# Patient Record
Sex: FEMALE | Race: WHITE | NOT HISPANIC OR LATINO | URBAN - METROPOLITAN AREA
[De-identification: names, ages, dates, MRNs, and addresses within clinical notes are randomized per-mention and may not be internally consistent; named-entity substitution may affect disease eponyms.]

---

## 2017-01-11 ENCOUNTER — COMMUNICATION - HEALTHEAST (OUTPATIENT)
Dept: SCHEDULING | Facility: CLINIC | Age: 27
End: 2017-01-11

## 2017-12-22 ENCOUNTER — COMMUNICATION - HEALTHEAST (OUTPATIENT)
Dept: SCHEDULING | Facility: CLINIC | Age: 27
End: 2017-12-22

## 2017-12-25 ENCOUNTER — COMMUNICATION - HEALTHEAST (OUTPATIENT)
Dept: SCHEDULING | Facility: CLINIC | Age: 27
End: 2017-12-25

## 2019-02-27 ENCOUNTER — HOSPITAL ENCOUNTER (OUTPATIENT)
Dept: MEDSURG UNIT | Facility: CLINIC | Age: 29
Discharge: HOME | End: 2019-02-28
Attending: FAMILY MEDICINE | Admitting: FAMILY MEDICINE

## 2019-02-27 DIAGNOSIS — B96.89 BACTERIAL VAGINOSIS: ICD-10-CM

## 2019-02-27 DIAGNOSIS — N76.0 BACTERIAL VAGINOSIS: ICD-10-CM

## 2019-02-27 ASSESSMENT — MIFFLIN-ST. JEOR: SCORE: 1263.13

## 2019-02-28 LAB
CLUE CELLS: ABNORMAL
TRICHOMONAS, WET PREP: ABNORMAL
YEAST, WET PREP: ABNORMAL

## 2019-03-01 LAB
C TRACH DNA SPEC QL PROBE+SIG AMP: NEGATIVE
N GONORRHOEA DNA SPEC QL NAA+PROBE: NEGATIVE

## 2019-03-02 ENCOUNTER — HOSPITAL ENCOUNTER (OUTPATIENT)
Dept: OBGYN | Facility: HOSPITAL | Age: 29
Discharge: HOME | End: 2019-03-02
Attending: OBSTETRICS & GYNECOLOGY | Admitting: OBSTETRICS & GYNECOLOGY

## 2019-03-02 DIAGNOSIS — M54.9 BACK PAIN AFFECTING PREGNANCY, ANTEPARTUM: ICD-10-CM

## 2019-03-02 DIAGNOSIS — O99.891 BACK PAIN AFFECTING PREGNANCY, ANTEPARTUM: ICD-10-CM

## 2019-03-02 LAB
ABO/RH(D): NORMAL
ABO/RH(D): NORMAL
ALBUMIN UR-MCNC: ABNORMAL MG/DL
AMPHETAMINES UR QL SCN: ABNORMAL
ANTIBODY SCREEN: NORMAL
APPEARANCE UR: ABNORMAL
BACTERIA #/AREA URNS HPF: ABNORMAL HPF
BARBITURATES UR QL: ABNORMAL
BENZODIAZ UR QL: ABNORMAL
BILIRUB UR QL STRIP: NEGATIVE
CANNABINOIDS UR QL SCN: ABNORMAL
COCAINE UR QL: ABNORMAL
COLOR UR AUTO: YELLOW
COMPONENT (HISTORICAL CONVERSION): NORMAL
CREAT UR-MCNC: 70 MG/DL
ERYTHROCYTE [DISTWIDTH] IN BLOOD BY AUTOMATED COUNT: 12.4 % (ref 11–14.5)
ETHANOL UR CFM-MCNC: <10 MG/DL
FBS KIT EXPIRATION DATE: NORMAL
FBS KIT LOT #: NORMAL
FETAL BLEED SCREEN: NORMAL
GLUCOSE UR STRIP-MCNC: NEGATIVE MG/DL
HBV SURFACE AG SERPL QL IA: NEGATIVE
HCT VFR BLD AUTO: 28.5 % (ref 35–47)
HGB BLD-MCNC: 9.8 G/DL (ref 12–16)
HGB UR QL STRIP: ABNORMAL
HIV 1+2 AB+HIV1 P24 AG SERPL QL IA: NEGATIVE
KETONES UR STRIP-MCNC: NEGATIVE MG/DL
LEUKOCYTE ESTERASE UR QL STRIP: ABNORMAL
LOT NUMBER (HISTORICAL CONVERSION): NORMAL
MCH RBC QN AUTO: 30.5 PG (ref 27–34)
MCHC RBC AUTO-ENTMCNC: 34.4 G/DL (ref 32–36)
MCV RBC AUTO: 89 FL (ref 80–100)
METHADONE UR QL SCN: ABNORMAL
MUCOUS THREADS #/AREA URNS LPF: ABNORMAL LPF
NITRATE UR QL: NEGATIVE
OPIATES UR QL SCN: ABNORMAL
OXYCODONE UR QL: ABNORMAL
PCP UR QL SCN: ABNORMAL
PH UR STRIP: 7.5 [PH] (ref 4.5–8)
PLATELET # BLD AUTO: 210 THOU/UL (ref 140–440)
PMV BLD AUTO: 11.1 FL (ref 8.5–12.5)
RBC # BLD AUTO: 3.21 MILL/UL (ref 3.8–5.4)
RBC #/AREA URNS AUTO: ABNORMAL HPF
SP GR UR STRIP: 1.02 (ref 1–1.03)
SQUAMOUS #/AREA URNS AUTO: >100 LPF
STATUS (HISTORICAL CONVERSION): NORMAL
UROBILINOGEN UR STRIP-ACNC: ABNORMAL
WBC #/AREA URNS AUTO: ABNORMAL HPF
WBC: 10.2 THOU/UL (ref 4–11)

## 2019-03-02 ASSESSMENT — MIFFLIN-ST. JEOR: SCORE: 1263.13

## 2019-03-03 LAB
BACTERIA SPEC CULT: NO GROWTH
T PALLIDUM AB SER QL: NEGATIVE

## 2019-03-04 LAB — RUBV IGG SERPL QL IA: POSITIVE

## 2019-03-05 LAB
AMPHET UR-MCNC: 1845 NG/ML
CARBOXYTHC UR-MCNC: 53 NG/ML
MDA UR-MCNC: <200 NG/ML
MDEA UR-MCNC: <200 NG/ML
MDMA UR-MCNC: <200 NG/ML
METHAMPHET UR-MCNC: 7967 NG/ML
PHENTERMINE UR CFM-MCNC: <200 NG/ML

## 2019-03-21 ENCOUNTER — HOSPITAL ENCOUNTER (OUTPATIENT)
Dept: MEDSURG UNIT | Facility: CLINIC | Age: 29
Discharge: LEFT BEFORE DISCHARGE | End: 2019-03-21
Attending: FAMILY MEDICINE | Admitting: FAMILY MEDICINE

## 2019-03-21 LAB
ALBUMIN UR-MCNC: NEGATIVE MG/DL
AMPHETAMINES UR QL SCN: ABNORMAL
APPEARANCE UR: ABNORMAL
BACTERIA #/AREA URNS HPF: ABNORMAL HPF
BARBITURATES UR QL: ABNORMAL
BENZODIAZ UR QL: ABNORMAL
BILIRUB UR QL STRIP: NEGATIVE
CANNABINOIDS UR QL SCN: ABNORMAL
CLUE CELLS: NORMAL
COCAINE UR QL: ABNORMAL
COLOR UR AUTO: YELLOW
CREAT UR-MCNC: 92.7 MG/DL
ETHANOL UR CFM-MCNC: <10 MG/DL
GLUCOSE UR STRIP-MCNC: NEGATIVE MG/DL
HGB UR QL STRIP: ABNORMAL
KETONES UR STRIP-MCNC: NEGATIVE MG/DL
LEUKOCYTE ESTERASE UR QL STRIP: NEGATIVE
METHADONE UR QL SCN: ABNORMAL
MUCOUS THREADS #/AREA URNS LPF: ABNORMAL LPF
NITRATE UR QL: NEGATIVE
OPIATES UR QL SCN: ABNORMAL
OXYCODONE UR QL: ABNORMAL
PCP UR QL SCN: ABNORMAL
PH UR STRIP: 6 [PH] (ref 4.5–8)
RBC #/AREA URNS AUTO: ABNORMAL HPF
SP GR UR STRIP: 1.01 (ref 1–1.03)
SQUAMOUS #/AREA URNS AUTO: >100 LPF
TRICHOMONAS, WET PREP: NORMAL
UROBILINOGEN UR STRIP-ACNC: ABNORMAL
WBC #/AREA URNS AUTO: ABNORMAL HPF
YEAST, WET PREP: NORMAL

## 2019-03-21 ASSESSMENT — MIFFLIN-ST. JEOR: SCORE: 1263.13

## 2019-03-22 LAB
C TRACH DNA SPEC QL PROBE+SIG AMP: NEGATIVE
N GONORRHOEA DNA SPEC QL NAA+PROBE: NEGATIVE

## 2019-03-24 LAB
AMPHET UR-MCNC: 461 NG/ML
CARBOXYTHC UR-MCNC: 27 NG/ML
MDA UR-MCNC: <200 NG/ML
MDEA UR-MCNC: <200 NG/ML
MDMA UR-MCNC: <200 NG/ML
METHAMPHET UR-MCNC: 666 NG/ML
PHENTERMINE UR CFM-MCNC: <200 NG/ML

## 2019-04-06 ENCOUNTER — HOSPITAL ENCOUNTER (OUTPATIENT)
Dept: OBGYN | Facility: CLINIC | Age: 29
Discharge: HOME | End: 2019-04-06
Attending: STUDENT IN AN ORGANIZED HEALTH CARE EDUCATION/TRAINING PROGRAM | Admitting: STUDENT IN AN ORGANIZED HEALTH CARE EDUCATION/TRAINING PROGRAM

## 2019-04-06 ENCOUNTER — COMMUNICATION - HEALTHEAST (OUTPATIENT)
Dept: SCHEDULING | Facility: CLINIC | Age: 29
End: 2019-04-06

## 2019-04-06 DIAGNOSIS — J45.909 ASTHMA, UNSPECIFIED ASTHMA SEVERITY, UNSPECIFIED WHETHER COMPLICATED, UNSPECIFIED WHETHER PERSISTENT: ICD-10-CM

## 2019-04-06 DIAGNOSIS — G47.00 INSOMNIA, UNSPECIFIED TYPE: ICD-10-CM

## 2019-04-11 ENCOUNTER — HOSPITAL ENCOUNTER (OUTPATIENT)
Dept: MEDSURG UNIT | Facility: CLINIC | Age: 29
Discharge: HOME | End: 2019-04-11
Attending: FAMILY MEDICINE | Admitting: FAMILY MEDICINE

## 2019-04-11 LAB
ALBUMIN UR-MCNC: ABNORMAL MG/DL
ANION GAP SERPL CALCULATED.3IONS-SCNC: 9 MMOL/L (ref 5–18)
APPEARANCE UR: ABNORMAL
BACTERIA #/AREA URNS HPF: ABNORMAL HPF
BASOPHILS # BLD AUTO: 0 THOU/UL (ref 0–0.2)
BASOPHILS NFR BLD AUTO: 0 % (ref 0–2)
BILIRUB UR QL STRIP: NEGATIVE
BUN SERPL-MCNC: 8 MG/DL (ref 8–22)
CALCIUM SERPL-MCNC: 8.9 MG/DL (ref 8.5–10.5)
CHLORIDE BLD-SCNC: 105 MMOL/L (ref 98–107)
CLUE CELLS: NORMAL
CO2 SERPL-SCNC: 21 MMOL/L (ref 22–31)
COLOR UR AUTO: YELLOW
CREAT SERPL-MCNC: 0.69 MG/DL (ref 0.6–1.1)
EOSINOPHIL # BLD AUTO: 0.1 THOU/UL (ref 0–0.4)
EOSINOPHIL NFR BLD AUTO: 1 % (ref 0–6)
ERYTHROCYTE [DISTWIDTH] IN BLOOD BY AUTOMATED COUNT: 12.7 % (ref 11–14.5)
GFR SERPL CREATININE-BSD FRML MDRD: >60 ML/MIN/1.73M2
GLUCOSE BLD-MCNC: 109 MG/DL (ref 70–125)
GLUCOSE UR STRIP-MCNC: NEGATIVE MG/DL
HCT VFR BLD AUTO: 29.9 % (ref 35–47)
HGB BLD-MCNC: 9.9 G/DL (ref 12–16)
HGB UR QL STRIP: NEGATIVE
KETONES UR STRIP-MCNC: NEGATIVE MG/DL
LEUKOCYTE ESTERASE UR QL STRIP: ABNORMAL
LYMPHOCYTES # BLD AUTO: 1.5 THOU/UL (ref 0.8–4.4)
LYMPHOCYTES NFR BLD AUTO: 15 % (ref 20–40)
MCH RBC QN AUTO: 28.9 PG (ref 27–34)
MCHC RBC AUTO-ENTMCNC: 33.1 G/DL (ref 32–36)
MCV RBC AUTO: 87 FL (ref 80–100)
MONOCYTES # BLD AUTO: 0.7 THOU/UL (ref 0–0.9)
MONOCYTES NFR BLD AUTO: 6 % (ref 2–10)
MUCOUS THREADS #/AREA URNS LPF: ABNORMAL LPF
NEUTROPHILS # BLD AUTO: 8.1 THOU/UL (ref 2–7.7)
NEUTROPHILS NFR BLD AUTO: 78 % (ref 50–70)
NITRATE UR QL: NEGATIVE
PH UR STRIP: 6 [PH] (ref 4.5–8)
PLATELET # BLD AUTO: 263 THOU/UL (ref 140–440)
PMV BLD AUTO: 11.6 FL (ref 8.5–12.5)
POTASSIUM BLD-SCNC: 4 MMOL/L (ref 3.5–5)
RBC # BLD AUTO: 3.42 MILL/UL (ref 3.8–5.4)
RBC #/AREA URNS AUTO: ABNORMAL HPF
SODIUM SERPL-SCNC: 135 MMOL/L (ref 136–145)
SP GR UR STRIP: 1.02 (ref 1–1.03)
SQUAMOUS #/AREA URNS AUTO: >100 LPF
TRICHOMONAS, WET PREP: NORMAL
UROBILINOGEN UR STRIP-ACNC: ABNORMAL
WBC #/AREA URNS AUTO: ABNORMAL HPF
WBC: 10.5 THOU/UL (ref 4–11)
YEAST, WET PREP: NORMAL

## 2019-04-11 ASSESSMENT — MIFFLIN-ST. JEOR: SCORE: 1300.56

## 2019-04-12 LAB — BACTERIA SPEC CULT: NO GROWTH

## 2019-05-02 ENCOUNTER — HOSPITAL ENCOUNTER (OUTPATIENT)
Dept: MEDSURG UNIT | Facility: CLINIC | Age: 29
Discharge: HOME | End: 2019-05-02
Attending: FAMILY MEDICINE | Admitting: FAMILY MEDICINE

## 2019-05-02 DIAGNOSIS — R05.9 COUGH: ICD-10-CM

## 2021-01-25 ENCOUNTER — COMMUNICATION - HEALTHEAST (OUTPATIENT)
Dept: SCHEDULING | Facility: CLINIC | Age: 31
End: 2021-01-25

## 2021-05-27 NOTE — TELEPHONE ENCOUNTER
"RN Assessment/Reason for Call:   Okay to leave Detailed Message  Patient calling in, 34 weeks pregnant.  \" haven't felt baby moved for a couple hours \"  [facility]  Plans to deliver in [facility].    RN Action/Disposition:  Protocol recommends ER.  Given Phone number for  NL.  Discussed kick count.  Agrees to plan.     Vanessa Fernandez, RN    [facility] Connection Triage/med refill  4/6/2019  9:25 PM        Reason for Disposition    [1] Pregnant 23 or more weeks AND [2] baby moving less today AND [3] unable or unwilling to perform kick count    Protocols used: PREGNANCY - DECREASED FETAL MOVEMENT-A-AH      "

## 2021-05-27 NOTE — PROGRESS NOTES
"[facility] OB Triage Note    CC: Vomiting, back and lower abdominal cramps    Subjective: Malcolm Norman is a  29 y.o. at 34w5d with a current prenatal history significant for late prenatal care, BV, iron deficiency anemia (ferritin 8 on 3/22/19) and amphetamines/THC/tobacco use during current pregnancy who presents to OB triage with vomiting since this morning.     Patient reports she has vomited 7x since waking up this morning. No hematemesis or significant nausea. Having some lower abdominal and low back pain that both feel like cramps, not contractions. Feeling ill for the past few days with a \"cold\", mild frontal headache and dry cough. No sick contacts, recent travel, suspicious food intake.     Endorses feeling both warm and with chills, but denies fevers or rigorous chills. Denies vision changes, shortness of breath, chest pain, epigastric/RUQ abd pain, dysuria, urinary frequency, urinary retention, vaginal discharge or irritation, and LE swelling. Has taken Tylenol 2x today without much relief. She continues to smoke 2 cigarettes daily; denies any other illicit drug use.     No contractions. No loss of fluid. No vaginal bleeding. Normal fetal movement.     Estimated Date of Delivery: 19 Patient's last menstrual period was 2018.  OB provider: Provider, No Primary Care  OB History        2    Para   1    Term   1            AB        Living   1       SAB        TAB        Ectopic        Multiple        Live Births   1             Hx of c/s about 12 years ago.     Objective:   Blood pressure 122/78, pulse 97, temperature 98.2  F (36.8  C), temperature source Oral, resp. rate 18, height 5' 3\" (1.6 m), weight 137 lb (62.1 kg), last menstrual period 2018.  General:   alert, appears stated age, cooperative and no distress   Skin:   normal   HEENT:  extra ocular movement intact   Lungs:   clear to auscultation bilaterally   Heart:   regular rate and rhythm, S1, " S2 normal, no murmur, click, rub or gallop   Extremities: Warm, dry and well perfused. No LE edema.   Abdomen: FHT present. Mild lower abdominal and low back tenderness, but no rebound or CVA tenderness   Membranes Intact per history   [place]:  None   FHT: Baseline: 145 bpm, Variability: Moderate (6 - 25 bpm), Accelerations: Present and Decelerations: Absent   Presentation: unsure   Cervix: Not performed     Laboratory Studies:   Results for orders placed or performed during the hospital encounter of 04/11/19   Wet Prep, Vaginal   Result Value Ref Range    Yeast Result No yeast seen No yeast seen    Trichomonas No Trichomonas seen No Trichomonas seen    Clue Cells, Wet Prep No Clue cells seen No Clue cells seen   Urinalysis-UC if Indicated   Result Value Ref Range    Color, UA Yellow Colorless, Yellow, Straw, Light Yellow    Clarity, UA Cloudy (!) Clear    Glucose, UA Negative Negative    Bilirubin, UA Negative Negative    Ketones, UA Negative Negative    Specific Gravity, UA 1.020 1.001 - 1.030    Blood, UA Negative Negative    pH, UA 6.0 4.5 - 8.0    Protein,  mg/dL (!) Negative mg/dL    Urobilinogen, UA 2.0 E.U./dL <2.0 E.U./dL, 2.0 E.U./dL    Nitrite, UA Negative Negative    Leukocytes, UA Large (!) Negative    Bacteria, UA Few (!) None Seen hpf    RBC, UA 3-5 (!) None Seen, 0-2 hpf    WBC, UA 5-10 (!) None Seen, 0-5 hpf    Squam Epithel, UA >100 (!) None Seen, 0-5 lpf    Mucus, UA Moderate (!) None Seen lpf   Basic Metabolic Panel   Result Value Ref Range    Sodium 135 (L) 136 - 145 mmol/L    Potassium 4.0 3.5 - 5.0 mmol/L    Chloride 105 98 - 107 mmol/L    CO2 21 (L) 22 - 31 mmol/L    Anion Gap, Calculation 9 5 - 18 mmol/L    Glucose 109 70 - 125 mg/dL    Calcium 8.9 8.5 - 10.5 mg/dL    BUN 8 8 - 22 mg/dL    Creatinine 0.69 0.60 - 1.10 mg/dL    GFR MDRD Af Amer >60 >60 mL/min/1.73m2    GFR MDRD Non Af Amer >60 >60 mL/min/1.73m2   HM1 (CBC with Diff)   Result Value Ref Range    WBC 10.5 4.0 - 11.0 thou/uL     RBC 3.42 (L) 3.80 - 5.40 mill/uL    Hemoglobin 9.9 (L) 12.0 - 16.0 g/dL    Hematocrit 29.9 (L) 35.0 - 47.0 %    MCV 87 80 - 100 fL    MCH 28.9 27.0 - 34.0 pg    MCHC 33.1 32.0 - 36.0 g/dL    RDW 12.7 11.0 - 14.5 %    Platelets 263 140 - 440 thou/uL    MPV 11.6 8.5 - 12.5 fL    Neutrophils % 78 (H) 50 - 70 %    Lymphocytes % 15 (L) 20 - 40 %    Monocytes % 6 2 - 10 %    Eosinophils % 1 0 - 6 %    Basophils % 0 0 - 2 %    Neutrophils Absolute 8.1 (H) 2.0 - 7.7 thou/uL    Lymphocytes Absolute 1.5 0.8 - 4.4 thou/uL    Monocytes Absolute 0.7 0.0 - 0.9 thou/uL    Eosinophils Absolute 0.1 0.0 - 0.4 thou/uL    Basophils Absolute 0.0 0.0 - 0.2 thou/uL        ASSESSMENT AND PLAN: Malcolm Norman is a  29 y.o. who presented to [facility] at 34w5d on 2019 with several episodes of nonbloody emesis and lower abdominal/back cramping that started this morning, was found to have normal vitals and labs, all consistent with viral gastroenteritis. Patient's UA had leukocytes, but also >100 squamous cells and she denies any urinary symptoms, so will follow urine culture before antibiotics. Reassuring CBC, BMP, wet prep. No s/sx of active labor. She denies any illicit drug use but continues to smoke tobacco, 2 cigarettes daily. Borderline normocytic anemia is stable. Proteinuria noted but normotensive.   1. Not in labor. Reassuring Category 1 FHT.   2. Encouraged smoking cessation.   3. Encouraged PO hydration, bland diet for now, Tylenol PRN.   4. Patient to follow up with OB provider Dr. Fiore. She's planning for repeat c/s at [facility].     Patient discussed with attending physician, Dr. Abdelrahman Garcia, who agrees with the plan.      Tacos Thomas MD, PGY2  [facility]   2019

## 2021-05-27 NOTE — PROGRESS NOTES
Pt presented to [facility] triage with complaints of sharp low back pain and persistant vomiting since 0500.  VSS.  EUM and US applied.  Pt denies cramping, leaking of fluid, bleeding or other obstetrical concerns.  +FM.  Resident to see pt.  All labs and UA WNL.  Pt feels like vomiting has resolved.  Has been able to keep down fluids and crackers.

## 2021-05-28 ENCOUNTER — RECORDS - HEALTHEAST (OUTPATIENT)
Dept: ADMINISTRATIVE | Facility: CLINIC | Age: 31
End: 2021-05-28

## 2021-05-28 NOTE — PROGRESS NOTES
Pt seen in triage for left rib pain. Pain increases when taking a deep breath. Pt states she has had a bad cough and had bad vomiting a few days ago. Ice pack applied to left rib. NST was non reactive and BPP ordered and was 8/8. OB resident Dr العراقي at bedside for evaluation. Attending physician contacted by Dr العراقي and Discharge orders received. Return precautions provided and pt verbalized understanding. Pt educated on being seen at a hospital where her provider delivered in the future. .

## 2021-06-02 ENCOUNTER — RECORDS - HEALTHEAST (OUTPATIENT)
Dept: ADMINISTRATIVE | Facility: CLINIC | Age: 31
End: 2021-06-02

## 2021-06-02 VITALS — WEIGHT: 127 LBS | BODY MASS INDEX: 21.68 KG/M2 | HEIGHT: 64 IN

## 2021-06-02 VITALS — WEIGHT: 127 LBS | HEIGHT: 64 IN | BODY MASS INDEX: 21.68 KG/M2

## 2021-06-02 VITALS — HEIGHT: 63 IN | BODY MASS INDEX: 24.27 KG/M2 | WEIGHT: 137 LBS

## 2021-06-02 VITALS — BODY MASS INDEX: 21.68 KG/M2 | HEIGHT: 64 IN | WEIGHT: 127 LBS

## 2021-06-14 NOTE — TELEPHONE ENCOUNTER
Helen called asking for a note for her  stating she needs her cats for emotional.    Helen said she has a new Rx for sertraline that Dr Justine Lee prescribed for her recently. I didn't find this Rx, nor this provider.    I found that Helen's provider is a [facility] provider.  When I told her this she simply hung up.    COVID 19 Nurse Triage Plan/Patient Instructions    Please be aware that novel coronavirus (COVID-19) may be circulating in the community. If you develop symptoms such as fever, cough, or SOB or if you have concerns about the presence of another infection including coronavirus (COVID-19), please contact your health care provider or visit [email or web address].     Disposition/Instructions    Home care recommended. Follow home care protocol based instructions.    Thank you for taking steps to prevent the spread of this virus.  o Limit your contact with others.  o Wear a simple mask to cover your cough.  o Wash your hands well and often.    Resources    [facility]: About COVID-19: [email or web address]    CDC: What to Do If You're Sick: www.cdc.gov/coronavirus/2019-ncov/about/steps-when-sick.html    CDC: Ending Home Isolation: www.cdc.gov/coronavirus/2019-ncov/hcp/disposition-in-home-patients.html     CDC: Caring for Someone: www.cdc.gov/coronavirus/2019-ncov/if-you-are-sick/care-for-someone.html     [facility]: Interim Guidance for Hospital Discharge to Home: www.health.[facility].[place].us/diseases/coronavirus/hcp/hospdischarge.pdf    [facility] clinical trials (COVID-19 research studies): clinicalaffairs.[email or web address]-clinical-trials     Below are the COVID-19 hotlines at the [facility] ([facility]). Interpreters are available.   o For health questions: Call 817-550-1068 or 1-399.746.1060 (7 a.m. to 7 p.m.)  o For questions about schools and childcare: Call 938-784-6622 or 1-423.200.1490 (7 a.m. to 7 p.m.)   Maris ALONZO RN FNA

## 2021-06-24 NOTE — PLAN OF CARE
"Patient came to unit with complaints of vaginal \"spotting\" and lower extremity swelling. States she just found out she was pregnant last week and had one appointment with and ultrasound at a clinic on 19. Per that ultrasound it dated gestation at approximately 27w and 5d on 19. Unsure of LMP. First baby was appx 12 years ago and was a  for breech presentation. States vaginal spotting began morning of 19 as \"bright red\" when she went to the bathroom and throughout the day changed to \"dark brownish-maroon.\" Only notices spotting when wiping or going to the bathroom, is not wearing a jay-pad at this time. No blood noted in underwear or upon sterile spec exam. Slight swelling noted in ankles and feet, but is non-pitting at this time. Dr. العراقي of [facility] involved and at bedside for evaluation.   "

## 2021-06-24 NOTE — PROGRESS NOTES
S- Patient presents to triage with onset of worsening back pain this am, mostly low back.  Denies discrete contractions, denies radiation to her legs.  Does have a history of kidney stones and she states this is different and worse.  Was seen 3 days ago at [facility] for vaginal bleeding, denies any further bleeding.  At the time, started on treatment for BV.  Has had only 2 visits to date with this pregnancy- one at  on  and one at [facility] earlier this week for confirmation of pregnancy.  Denies HA.  Does have history of opiod use- denies at this time.  States last THC was in November.  Does use tobacco.    O- Alert, O x 3  Ab gravid, soft  Pad dry  SVE closed, 3 cm long, medium consistency, ballotable  EFM baseline 140s with moderate variability  TOCO no discrete contractions  Ext NT    UA reviewed  Prenatal labs drawn  RH neg    A/P- 28 yo  at 29 weeks 0 days by third trimester USN with back pain- hx of stones, no obvious UTI, no evidence of labor at this time.  Has been in triage for several hours and while able to sleep off and on after flexeril still c/o pain.  Will obtain renal USN.  Rhogam to be administered for RH negative status and limited prenatal care.  I will re-evaluate in 1-2 hours and repeat SVE.  FFN was deferred as recent exams and bleeding.  At this time, no evidence of  labor so betamethasone is deferred, if SVE change noted than will initiate magnesium and steroids.    Lucia Brooke MD FACOG  [facility]  [phone]    Addendum- renal usn grossly normal, patient now eating,feels that she can manage pain at home.  Repeat SVE confirms posterior and closed cervix.  Discussed could still rarely be stone.  Discussed warnings for  labor, PPROM and abruption.  Discussed importance of establishing prenatal care.  All questions answered.  Rx for vistaril given.

## 2021-06-24 NOTE — PROGRESS NOTES
1125: Tylenol and Flexeril given PO for back pain. FHR tracing appropriate for gestational age. Orders placed for lab draw.   1215: Lab called, informed that pt is still waiting for lab draw.   1300: MD reviewed Labs. MD at bedside, new orders for Renal US and for pt to receive Rhophylac for RH Negative status.   1400: US dept busy and pt in line for US. Awaiting RH work up. Will administer Rhophylac when available.

## 2021-06-24 NOTE — PROGRESS NOTES
1015: Pt is G2, P1 at 29 wks, and presents with complaints of right sided back/flank pain. States the ain began about 0800 today. Pt recently found out she was pregnant by US. Pt has yet to establish PNC with a primary OB. She denies bleeding, leaking of fluid or contractions. States feeling good fetal movements. Pt states she was at [facility] 2 days ago and was diagnosed with BV and was started Flagyl two times a day. Dr. Brooke notified of pts arrival and complaints. UA obtained and sent. EFM/TOCO applied.

## 2021-06-25 NOTE — PROGRESS NOTES
Message left at [facility] regarding positive tox screen.  Waiting return call.      2:55pm  Spoke to Kelsie [phone] with [facility].   Previous reports have been made regarding positive tox screens for pt.  Pt has been assigned a Parent Support  Teresa Broussard with [facility].  The Parent Support Outreach Program is a voluntary program for mothers who have tested positive during pregnancy.  Once baby is born and baby or mother test positive then report to be made to child protection and child protection will become involved.

## 2021-06-25 NOTE — PROGRESS NOTES
EDC 5-18-19 presents to [facility] triage with complaints of abdominal and back pain and lower abdominal pressure x 2 days.  Denies leaking of fluid or vaginal bleeding.  Had not established prenatal care yet.  HIstory of CS with last pregnancy almost 12 years ago.  Patient had BV last month and took most but not all of her Flagyl for treatment.  Patient is smoker, has history of marijuana and amphetamine use.  EFM placed. Reactive category 1 tracing obtained,  No contractions noted.  Dr. Lai in to see, interview and assess patient.  Wet prep and GC, chlamydia swabs collected.  SVE by Dr. Ming johns.  Urine sample collected for UOB and drug screen.

## 2021-06-25 NOTE — PROGRESS NOTES
"Nurse went into pt's room pt was not there.  Carisa at desk states patient just left on the elevator stating \"I've been waiting all fucking day for a doctor and a doctor hasn't even been here.\"  Pt encouraged to return to room explained her doctor and nurse were in an emergency c/s.  Pt left on elevator AMA.      "

## 2021-06-27 NOTE — PROGRESS NOTES
"Progress Notes by Susan Krueger MD at 3/21/2019 10:03 AM     Author: Susan Krueger MD Service: Family Medicine Author Type: Resident    Filed: 3/21/2019  6:22 PM Date of Service: 3/21/2019 10:03 AM Status: Attested    : Susan Krueger MD (Resident) Cosigner: Danie Pope MD at 3/21/2019 10:34 PM    Attestation signed by Danie Pope MD at 3/21/2019 10:34 PM    3/21/2019  [facility] Faculty Attestation  I have seen and examined the patient.  I have discussed the case with the resident physician(s), Dr. Lai.  I agree with the findings, assessment and plan.    Danie Pope MD                          [facility] Medicine OB Triage    Subjective: Malcolm Norman is a  29 y.o. female at 31w5d with a current prenatal history significant for no prenatal care, use of tobacco, amphetamines and THC in pregnancy.     Patient presents for back and abdominal pain.    2 days ago she began having increasing pressure in her back and \"low down\" in her abdomen. It comes and goes. Not constant. She states it is sporadic and she cannot say if it is more than once per hour. Lasts for a few seconds to more than a minute. Has not had vaginal discharge, bleeding. Denies frequency, dysuria, hematuria.  Has had headaches that started yesterday with light headedness, nausea, some RUQ pain.  Does have chills, no fevers.   Denies chest pain or shortness of breath.   The pain has been staying the same over this period of time.     She found out a month ago that she was pregnant as she does not have regular periods. She had a 28-week ultrasound at [facility] for [facility], and she was not told of any fetal anatomy anomalies.  Does not know the position of her placenta.    She is currently sexually active, last episode was a few weeks ago. Nothing has been inside of her vagina in the last 24-48 hours. She has not had a sexually transmitted " "infection. 1 male partner, and that partner has no history of STD.  He does not use condoms.    This is her second pregnancy, son is about 12.     Patient has been seen in triage twice in the last month.  19 was for vaginal spotting, but prepped showed BV and she was given Flagyl, which she took about three quarters of the doses.  At that time gonorrhea and chlamydia was negative.  Second instance was on 3/2/19 for back pain.  She states that was different than this time.  At that time she had a bilateral kidney ultrasound as she has a history of stones as well as lab testing that showed the presence of THC and amphetamines in her urine but had a normal urinalysis, CBC, hepatitis B, HIV, rubella, syphilis testing.  She was found to be A- and received RhoGam at that time due to the previous bleeding.    She has a history of kidney stones in the past, does not feel that this pain is the same.    Cigarette use- 1/4 pack per day. Was about 3/4 PPD before she found out she was pregnant.   Drug use- THC and amphetamine, states she only used before she found out she was pregnant.     She has an apt [facility] for he first OB visit.     Medical history: Patient has asthma. She did not have complications with the first pregnancy. She has chronic back pain, denies pain med usage.      Estimated Date of Delivery: 19 Patient's last menstrual period was 2018.  OB provider: Provider, No Primary Care  OB History      Para Term  AB Living    2 1 1     1    SAB TAB Ectopic Multiple Live Births            1          Objective:   Blood pressure 121/67, pulse 90, temperature 98.6  F (37  C), temperature source Oral, resp. rate 16, height 5' 3.5\" (1.613 m), weight 127 lb (57.6 kg), last menstrual period 2018.  General:   alert, appears stated age and cooperative   Skin:   normal   HEENT:  PERRLA   Lungs:   clear to auscultation bilaterally   Heart:   regular rate and " rhythm, S1, S2 normal, no murmur, click, rub or gallop   Extremities: Warm, dry and well perfused. No edema.   Neuro: Reflexes 2+ and symmetric.    Abdomen: FHT present and tender in the right lower quadrant.   Membranes intact   [place]:  None   FHT: Baseline: 130 bpm, Variability: Moderate (6 - 25 bpm), Accelerations: Present and Decelerations: Absent   Cervix: Examined by myself   Dilation: Fingertip at the external os, cannot reach internal os   Effacement: Long   Station:  Floating   Consistency: firm   Position: posterior     Laboratory Studies:   Results for orders placed or performed during the hospital encounter of 03/21/19   Collect and send wet prep vaginal specimen as indicated by prenatal history and/or patient complaints   Result Value Ref Range    Yeast Result No yeast seen No yeast seen    Trichomonas No Trichomonas seen No Trichomonas seen    Clue Cells, Wet Prep No Clue cells seen No Clue cells seen   Urinalysis-UC if Indicated   Result Value Ref Range    Color, UA Yellow Colorless, Yellow, Straw, Light Yellow    Clarity, UA Hazy (!) Clear    Glucose, UA Negative Negative    Bilirubin, UA Negative Negative    Ketones, UA Negative Negative    Specific Gravity, UA 1.010 1.001 - 1.030    Blood, UA Small (!) Negative    pH, UA 6.0 4.5 - 8.0    Protein, UA Negative Negative mg/dL    Urobilinogen, UA <2.0 E.U./dL <2.0 E.U./dL, 2.0 E.U./dL    Nitrite, UA Negative Negative    Leukocytes, UA Negative Negative    Bacteria, UA None Seen None Seen hpf    RBC, UA 0-2 None Seen, 0-2 hpf    WBC, UA 0-5 None Seen, 0-5 hpf    Squam Epithel, UA >100 (!) None Seen, 0-5 lpf    Mucus, UA Few (!) None Seen lpf   Drug Abuse 1+, Urine   Result Value Ref Range    Amphetamines (!) Screen Negative     Screen Positive-Confirmatory test automatically ordered per OB protocol    Benzodiazepines Screen Negative Screen Negative    Opiates Screen Negative Screen Negative    Phencyclidine Screen Negative Screen Negative    THC (!)  Screen Negative     Screen Positive-Confirmatory test automatically ordered per OB protocol    Barbiturates Screen Negative Screen Negative    Cocaine Metabolite Screen Negative Screen Negative    Methadone Screen Negative Screen Negative    Oxycodone Screen Negative Screen Negative    Creatinine, Urine 92.7 mg/dL   Alcohol, Ethyl, Urine Screen   Result Value Ref Range    Alcohol, Urine <10 None detected mg/dL        Imaging:  OB US:  Fetal Heart Rate: 140 bpm  Cervical Length: 3.3 cm  Distance from Placenta to Cervix: 7.12 cm     EDC by This US exam: 2019     Composite Age by This US: 31 weeks 2 days     IMPRESSION:   CONCLUSION:    1.  Single living intrauterine gestation.  2.  Based on this ultrasound, composite age of 31 weeks 2 days with EDC 2019.  3.  Normal interval growth.  4.  No fetal anomalies are identified. The exam is mildly compromised by relatively advanced fetal age. The current fetal lie is breech.    EXAM: US APPENDIX  LOCATION: [facility]  DATE/TIME: 3/21/2019 4:26 PM     INDICATION: Right lower quadrant abdominal pain while pregnant.  COMPARISON: None.  TECHNIQUE: Routine.     FINDINGS: Grayscale and Doppler ultrasound evaluation of the right lower quadrant was performed at the site of patient's pain. The appendix is not visualized. No fluid collection or abscess identified.     IMPRESSION:   CONCLUSION:  Nonvisualization of the appendix.    ASSESSMENT AND PLAN: Malcolm ANGULO MariuszrikkiNelly is a  29 y.o. female who presented to [facility] [place] at 31w5d on 3/21/2019 with complaints of intermittent low back and lower abdominal pain for 2 days associated with some headache, vision change, right upper quadrant pain, chills.  On presentation, patient's vital signs are stable and afebrile with normal blood pressures.  Exam notes right lower quadrant tenderness on palpation with very mild CVA tenderness on the right.  Lab testing showed amphetamines and THC  in her urine, normal wet prep, normal urinalysis without casts, protein, hematuria.  Gonorrhea and chlamydia pending, but negative 1 month prior. Full OB ultrasound with fetal anatomy was performed as well as a limited ultrasound to evaluate her ovaries and appendix.  These showed overall normal fetal anatomy and interval growth with breech position, right ovary without pathology, and appendix was not visualized. No fluid collection or abscess seen in the area of pain.  On the fetal heart rate tracing, patient remained a category 1 when checked without any significant contractions.  Cervix closed and long.  1. Not in labor  2. Back and lower abdominal pain most likely musculoskeletal.  Patient does have a history of chronic back pain. No cause of the pain was found on lab testing or imaging. Recommend tylenol.  3. THC and amphetamines in urine, social work consulted, see their note.   4. Patient has first OB appointment tomorrow.  5. Patient did leave AMA before results were back. She was quite upset at the length of time she was here and did yell loud profanities at staff.    6. I did attempt to call patient with the results, no answer so a message was left to call the floor is she would like results.     Patient discussed with attending physician, , who agrees with the plan.      Susan Olivares MD PGY3 3/21/2019  [facility]

## 2021-06-27 NOTE — PROGRESS NOTES
"Progress Notes by Paula العراقي MD at 2019 12:44 AM     Author: Paula العراقي MD Service: Family Medicine Author Type: Resident    Filed: 2019  1:51 AM Date of Service: 2019 12:44 AM Status: Attested    : Paula العراقي MD (Resident) Cosigner: Danie Pope MD at 3/5/2019  9:13 AM    Attestation signed by Danie Pope MD at 3/5/2019  9:13 AM    3/5/2019  [facility] Faculty Attestation  I have discussed the case in the early morning hours of 2019 with the resident physician(s), Dr. العراقي.  I reviewed the Triage note.  I agree with the findings, assessment and plan.    Danie Pope MD                          [facility] Medicine OB Triage    Subjective: Malcolm Norman is a  29 y.o. female at 28w5d with a current prenatal history significant for prior  for breech presentation, tobacco use, marijuana use and no prenatal care who presents to OB triage with complaints of vaginal \"spotting,\" and lower extremity swelling. She reports that she discovered she was pregnant last week when she noticed her pants were too small. This was an unplanned pregnancy. She was able to go to a clinic in [place] in which an ultrasound was performed which dated gestation approximately 28w4d on 19, she denies hearing about signs of placenta previa on US. She has been unable to follow up and establish care with a provider since one week ago. She has had no prenatal care. She notes that she noticed \"bright red\" blood in her urine since the morning of 19, which she noticed when she wiped on toilet paper. The color progressed throughout the day to appear \"dark brown to maroonish,\" in color. She noticed dark brown discharge on her underwear. Has had no contractions, leakage of fluid, dysuria, abdominal pain, back pain, headache, vision changes, chest pain or shortness of breath. She did note that she has had intermittent episodes of brief lightheaded dizziness upon standing up from " "prolonged sitting. Has had inadequate PO intake and notes that she \"drinks a lot of pop.\" Has been feeling normal fetal movement.    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     Her first pregnancy was 12 years ago which resulted in a  for breech presentation. She reports that there were no complications and denies history of preeclampsia, GDM, etc. She reports that she delivered at [facility].     Per chart review, pt has a history of opioid addiction in 2017 in which she was receiving suboxone. Pt denies current use of any medication other than albuterol intermittently for her history of asthma.     Social History:  Smokes 0.5ppd  Denies alcohol use this pregnancy  Marijuana use during pregnancy, reports last use was Thanksgiving  Father of baby not involved in pregnancy    Estimated Date of Delivery: 19 Patient's last menstrual period was 2018.  OB provider: Provider, No Primary Care  OB History      Para Term  AB Living    2 1 1     1    SAB TAB Ectopic Multiple Live Births            1          Objective:   Last menstrual period 2018.  General:   alert, appears stated age, cooperative and smells of tobacco smoke   Skin:   normal   HEENT:  sclera clear, anicteric   Lungs:   clear to auscultation bilaterally   Heart:   regular rate and rhythm, S1, S2 normal, no murmur, click, rub or gallop   Extremities: Warm, dry and well perfused. Trace, non-pitting edema.   Neuro: Reflexes 2+ and symmetric.    Abdomen: FHT present   Membranes intact   [place]:  Irregular pattern   FHT: Difficult to trace fetal pattern. 142bpm baseline, positive acelerations, no decels, category I tracing   Presentation: unsure   Pelvic/: Sterile speculum exam. Normal external genitalia. Moderate amount of creamy discharge in vaginal " vault, cervix visualized and closed, no blood noted coming from cervix or in vaginal vault. Cervix is pink, with no discolorations, no lacerations. Bimanual exam with no adnexal tenderness.                          Laboratory Studies:   Results for orders placed or performed during the hospital encounter of 19   Wet Prep, Vaginal   Result Value Ref Range    Yeast Result No yeast seen No yeast seen    Trichomonas No Trichomonas seen No Trichomonas seen    Clue Cells, Wet Prep Clue cells seen (!) No Clue cells seen        ASSESSMENT AND PLAN: Malcolm Norman is a  29 y.o. female with no prenatal care and tobacco use disorder who presented to [facility] at estimated 28w5d on 2019 with complaints of one day of vaginal spotting. Recently found out that she was pregnant, has not established care. Was able to have an US performed that showed that she had an KATTY of around 19, unable to obtain records, pt denied any mention of placenta previa. No signs of labor on exam. FHT category I tracing, however, difficult to trace due to gestational age. Last episode of intercourse was one week ago. Wet prep was positive for clue cells consistent with bacterial vaginosis. This is likely contributing to the discharge she has been noticing. GC/Chlamydia collected and pending. Pt declined a urine sample. Counseled patient on importance of establishing a PCP to be involved in her prenatal care. Advised patient that she needs to be seen in clinic in the next few days. No signs concerning for placenta previa. Given results, spotting likely due to bacterial vaginosis or another infectious process, unable to rule out concurrent UTI at this time given pt's decline of a UA.   1. Not in labor.  2. Metronidazole 500mg two times a day for 7 days for treatment of bacterial vaginosis  3. Establish care with a PCP for ongoing OB management within the next few days. Given prior opioid  dependence, pt restricted to [facility] in [place].  4. Encouraged pt to establish care with PCP to discuss work restrictions, since she has noticed increased swelling after ambulating at work all day.   5. Obtain US records from outside clinic, or consider future US to confirm position, given prior breech presentation, and placenta location.   6. Counseled on signs and symptoms to return to [facility], including fever/chills, increased abdominal pain/back pain, vaginal bleeding, leakage of fluids, contractions, etc.     Patient discussed with attending physician, Dr.Mark Pope, who agrees with the plan.      Paula العراقي MD PGY1 2/28/2019  [facility]

## 2021-06-27 NOTE — PROGRESS NOTES
Progress Notes by Maxwell Mckinley MD at 2019 11:35 PM     Author: Maxwell Mckinley MD Service: Family Medicine Author Type: Resident    Filed: 2019 11:42 PM Date of Service: 2019 11:35 PM Status: Attested    : Maxwell Mckinley MD (Physician) Cosigner: Lucia Smith MD at 2019  3:50 PM    Attestation signed by Lucia Smith MD at 2019  3:50 PM    2019  [facility] Faculty Attestation  I have discussed the case with the resident physician(s), Dr. Mckinley.  I agree with the findings, assessment and plan.    Lucia Smith MD                          [facility] OB Triage    Subjective: Malcolm Norman is a  29 y.o. female at 34w0d with a current prenatal history significant for insufficient prenatal care and positive urine drug screen and pregnancy for amphetamines and THC who presents to OB triage with decreased fetal movements started about 5 hours prior.  Patient states that baby is usually very active, but is less active today.  Denies any chest pain, shortness of breath, headaches, abdominal pain, changes in vision, extremity swelling.  Of note, patient does describe that she has been dealing with the cough over the past couple days.  Patient does have asthma, but has not used her inhaler over the past year and does not currently have one.  Patient also reports decreased sleep.      Estimated Date of Delivery: 19 Patient's last menstrual period was 2018.  OB provider: Provider, No Primary Care  OB History      Para Term  AB Living    2 1 1     1    SAB TAB Ectopic Multiple Live Births            1          Objective:   Last menstrual period 2018.  General:   alert, appears stated age, cooperative and no distress   Skin:   normal   HEENT:  extra ocular movement intact, sclera clear, anicteric and trachea midline   Lungs:   clear to auscultation bilaterally   Heart:   regular rate and rhythm, S1, S2 normal, no murmur, click, rub or  gallop   Extremities: Warm, dry and well perfused. no edema.   Neuro: Reflexes 2+ and symmetric.    Abdomen: FHT present   Membranes intact   [place]:  None   FHT: Baseline: 135 bpm, Variability: Moderate (6 - 25 bpm), Accelerations: Present and Decelerations: Absent     Laboratory Studies:   Results for orders placed or performed during the hospital encounter of 03/21/19   Collect and send wet prep vaginal specimen as indicated by prenatal history and/or patient complaints   Result Value Ref Range    Yeast Result No yeast seen No yeast seen    Trichomonas No Trichomonas seen No Trichomonas seen    Clue Cells, Wet Prep No Clue cells seen No Clue cells seen   Chlamydia trachomatis & Neisseria gonorrhoeae, Amplified Detection   Result Value Ref Range    Chlamydia trachomatis, Amplified Detection Negative Negative    Neisseria gonorrhoeae, Amplified Detection Negative Negative   Urinalysis-UC if Indicated   Result Value Ref Range    Color, UA Yellow Colorless, Yellow, Straw, Light Yellow    Clarity, UA Hazy (!) Clear    Glucose, UA Negative Negative    Bilirubin, UA Negative Negative    Ketones, UA Negative Negative    Specific Gravity, UA 1.010 1.001 - 1.030    Blood, UA Small (!) Negative    pH, UA 6.0 4.5 - 8.0    Protein, UA Negative Negative mg/dL    Urobilinogen, UA <2.0 E.U./dL <2.0 E.U./dL, 2.0 E.U./dL    Nitrite, UA Negative Negative    Leukocytes, UA Negative Negative    Bacteria, UA None Seen None Seen hpf    RBC, UA 0-2 None Seen, 0-2 hpf    WBC, UA 0-5 None Seen, 0-5 hpf    Squam Epithel, UA >100 (!) None Seen, 0-5 lpf    Mucus, UA Few (!) None Seen lpf   Drug Abuse 1+, Urine   Result Value Ref Range    Amphetamines (!) Screen Negative     Screen Positive-Confirmatory test automatically ordered per OB protocol    Benzodiazepines Screen Negative Screen Negative    Opiates Screen Negative Screen Negative    Phencyclidine Screen Negative Screen Negative    THC (!) Screen Negative     Screen Positive-Confirmatory  test automatically ordered per OB protocol    Barbiturates Screen Negative Screen Negative    Cocaine Metabolite Screen Negative Screen Negative    Methadone Screen Negative Screen Negative    Oxycodone Screen Negative Screen Negative    Creatinine, Urine 92.7 mg/dL   Alcohol, Ethyl, Urine Screen   Result Value Ref Range    Alcohol, Urine <10 None detected mg/dL   Amphetamines, Urine, Quantitative   Result Value Ref Range    Amphetamine, Urn, Quant 461 ng/mL    MDA, Urn, Quant <200 ng/mL    MDEA, Urn, Quant <200 ng/mL    MDMA, Urn, Quant <200 ng/mL    Methamphetamine, Urn, Quant 666 ng/mL    Phentermine, Urn, Quant <200 ng/mL   THC Metabolite, Urine, Quantitative   Result Value Ref Range    65-[id number]-THC, Urn, Quant 27 ng/mL        ASSESSMENT AND PLAN: Malcolm Norman is a  29 y.o. female who presented to [facility] at 34w0d on 2019 with decreased fetal movement, however NST is reassuring with baseline of 135 and category 1 tracing.  1. Not in labor.  2. Cough--given 1 dose of Robitussin prior to leaving triage  3. Poor sleep--likely pregnancy related, given 1 dose of Vistaril here and sending patient home with prescription  4. Asthma--sending patient home with albuterol prescription    In disposition, follow-up with PCP in the next week    Patient discussed with attending physician, Dr.Kathryn Smith, who agrees with the plan.      Maxwell Mckinley MD PGY1 2019  [facility]

## 2021-07-14 PROBLEM — T83.32XA IUD THREADS LOST: Status: RESOLVED | Noted: 2019-07-14 | Resolved: 2021-01-19

## 2021-08-03 PROBLEM — T40.601A OPIATE OVERDOSE, ACCIDENTAL OR UNINTENTIONAL, INITIAL ENCOUNTER (H): Status: RESOLVED | Noted: 2017-07-11 | Resolved: 2021-01-19

## 2022-07-09 ENCOUNTER — HEALTH MAINTENANCE LETTER (OUTPATIENT)
Age: 32
End: 2022-07-09

## 2022-09-04 ENCOUNTER — HEALTH MAINTENANCE LETTER (OUTPATIENT)
Age: 32
End: 2022-09-04

## 2023-07-23 ENCOUNTER — HEALTH MAINTENANCE LETTER (OUTPATIENT)
Age: 33
End: 2023-07-23